# Patient Record
Sex: FEMALE | Race: WHITE | NOT HISPANIC OR LATINO | Employment: FULL TIME | URBAN - METROPOLITAN AREA
[De-identification: names, ages, dates, MRNs, and addresses within clinical notes are randomized per-mention and may not be internally consistent; named-entity substitution may affect disease eponyms.]

---

## 2017-03-14 PROBLEM — N97.0 INFERTILITY ASSOCIATED WITH ANOVULATION: Status: ACTIVE | Noted: 2017-03-14

## 2017-07-12 PROBLEM — Z31.41 FERTILITY TESTING: Status: ACTIVE | Noted: 2017-07-12

## 2017-08-09 PROBLEM — N84.0 POLYP, UTERUS CORPUS: Status: ACTIVE | Noted: 2017-08-09

## 2017-08-09 PROBLEM — N93.9 ABNORMAL UTERINE BLEEDING (AUB): Status: ACTIVE | Noted: 2017-08-09

## 2018-11-07 PROBLEM — Z31.41 FERTILITY TESTING: Status: RESOLVED | Noted: 2017-07-12 | Resolved: 2018-11-07

## 2018-11-26 PROBLEM — Z98.890 S/P D&C (STATUS POST DILATION AND CURETTAGE): Status: ACTIVE | Noted: 2018-11-26

## 2019-03-18 ENCOUNTER — TELEPHONE (OUTPATIENT)
Dept: OBSTETRICS AND GYNECOLOGY | Facility: HOSPITAL | Age: 39
End: 2019-03-18

## 2019-03-18 DIAGNOSIS — N30.00 ACUTE CYSTITIS WITHOUT HEMATURIA: Primary | ICD-10-CM

## 2019-03-18 RX ORDER — CIPROFLOXACIN 250 MG/1
250 TABLET, FILM COATED ORAL EVERY 12 HOURS
Qty: 6 TABLET | Refills: 0 | Status: SHIPPED | OUTPATIENT
Start: 2019-03-18 | End: 2019-03-21

## 2019-03-18 NOTE — TELEPHONE ENCOUNTER
Called patient. All questions answered regarding urine culture and antibiotics.    Ivonne Ball MD  OB/GYN, PGY-3

## 2019-03-18 NOTE — TELEPHONE ENCOUNTER
Left VM with patient.   UCx w/ drug resistance - resistant to bactrim which was initially prescribed. Will rx course of cipro. Call back with questions.

## 2019-03-26 PROBLEM — Z98.890 S/P D&C (STATUS POST DILATION AND CURETTAGE): Status: RESOLVED | Noted: 2018-11-26 | Resolved: 2019-03-26

## 2019-03-26 PROBLEM — E66.01 MORBID OBESITY: Status: ACTIVE | Noted: 2019-03-26

## 2019-03-27 PROBLEM — Z98.890 STATUS POST D&C: Status: ACTIVE | Noted: 2019-03-27

## 2019-03-30 PROBLEM — R10.9 ABDOMINAL PAIN: Status: ACTIVE | Noted: 2019-03-30

## 2019-11-27 PROBLEM — Z01.419 WELL WOMAN EXAM: Status: ACTIVE | Noted: 2019-11-27

## 2020-06-15 PROBLEM — Z90.710 S/P HYSTERECTOMY: Status: ACTIVE | Noted: 2020-06-15

## 2020-06-28 ENCOUNTER — NURSE TRIAGE (OUTPATIENT)
Dept: ADMINISTRATIVE | Facility: CLINIC | Age: 40
End: 2020-06-28

## 2020-06-28 NOTE — TELEPHONE ENCOUNTER
Spoke with patient via [facility]. Pt denies any symptoms of cough, fever, or difficulty breathing since the procedure. Information only protocol, no further calls required.

## 2020-07-22 PROBLEM — N93.9 VAGINAL BLEEDING: Status: ACTIVE | Noted: 2020-07-22

## 2020-07-22 PROBLEM — T81.328A VAGINAL CUFF DEHISCENCE: Status: ACTIVE | Noted: 2020-07-22

## 2020-07-22 PROBLEM — T81.31XA VAGINAL CUFF DEHISCENCE: Status: ACTIVE | Noted: 2020-07-22

## 2020-07-22 PROBLEM — N93.9 VAGINAL BLEEDING: Status: RESOLVED | Noted: 2020-07-22 | Resolved: 2020-07-22

## 2021-05-06 ENCOUNTER — PATIENT MESSAGE (OUTPATIENT)
Dept: RESEARCH | Facility: HOSPITAL | Age: 41
End: 2021-05-06

## 2021-12-17 PROBLEM — R10.31 RIGHT LOWER QUADRANT ABDOMINAL PAIN: Status: ACTIVE | Noted: 2021-12-17

## 2022-03-04 PROBLEM — I10 HTN (HYPERTENSION): Status: ACTIVE | Noted: 2022-03-04

## 2022-03-04 PROBLEM — N20.0 NEPHROLITHIASIS: Status: ACTIVE | Noted: 2022-03-04

## 2022-03-04 PROBLEM — N39.0 UTI (URINARY TRACT INFECTION): Status: ACTIVE | Noted: 2022-03-04

## 2023-10-09 PROBLEM — R79.89 ELEVATED TSH: Status: ACTIVE | Noted: 2023-10-09

## 2023-10-09 PROBLEM — E66.01 MORBID OBESITY WITH BMI OF 40.0-44.9, ADULT: Status: ACTIVE | Noted: 2023-10-09

## 2024-02-01 PROBLEM — Z83.3 FAMILY HISTORY OF DIABETES MELLITUS: Status: ACTIVE | Noted: 2024-02-01

## 2024-02-01 PROBLEM — E55.9 VITAMIN D DEFICIENCY: Status: ACTIVE | Noted: 2024-02-01

## 2024-02-02 ENCOUNTER — PATIENT OUTREACH (OUTPATIENT)
Dept: ADMINISTRATIVE | Facility: HOSPITAL | Age: 44
End: 2024-02-02

## 2024-04-22 ENCOUNTER — PATIENT OUTREACH (OUTPATIENT)
Dept: ADMINISTRATIVE | Facility: HOSPITAL | Age: 44
End: 2024-04-22

## 2024-09-10 PROBLEM — E66.812 CLASS 2 OBESITY DUE TO EXCESS CALORIES WITHOUT SERIOUS COMORBIDITY WITH BODY MASS INDEX (BMI) OF 36.0 TO 36.9 IN ADULT: Status: ACTIVE | Noted: 2024-09-10

## 2024-09-10 PROBLEM — E66.09 CLASS 2 OBESITY DUE TO EXCESS CALORIES WITHOUT SERIOUS COMORBIDITY WITH BODY MASS INDEX (BMI) OF 36.0 TO 36.9 IN ADULT: Status: ACTIVE | Noted: 2024-09-10

## 2024-09-17 ENCOUNTER — TELEPHONE (OUTPATIENT)
Dept: NUTRITION | Facility: CLINIC | Age: 44
End: 2024-09-17

## 2025-03-31 ENCOUNTER — PATIENT MESSAGE (OUTPATIENT)
Dept: ADMINISTRATIVE | Facility: HOSPITAL | Age: 45
End: 2025-03-31